# Patient Record
Sex: FEMALE | Race: WHITE | NOT HISPANIC OR LATINO | URBAN - METROPOLITAN AREA
[De-identification: names, ages, dates, MRNs, and addresses within clinical notes are randomized per-mention and may not be internally consistent; named-entity substitution may affect disease eponyms.]

---

## 2021-12-06 ENCOUNTER — NEW PATIENT (OUTPATIENT)
Dept: URBAN - METROPOLITAN AREA CLINIC 53 | Facility: CLINIC | Age: 76
End: 2021-12-06

## 2021-12-06 DIAGNOSIS — H25.13: ICD-10-CM

## 2021-12-06 PROCEDURE — 99204 OFFICE O/P NEW MOD 45 MIN: CPT

## 2021-12-06 ASSESSMENT — TONOMETRY
OD_IOP_MMHG: 15
OS_IOP_MMHG: 14

## 2021-12-06 ASSESSMENT — VISUAL ACUITY
OU_CC: J2
OS_SC: 20/50
OU_CC: 20/40-2
OD_CC: 20/40-2
OS_CC: 20/60
OD_SC: 20/60

## 2021-12-16 ENCOUNTER — DIAGNOSTICS ONLY (OUTPATIENT)
Dept: URBAN - METROPOLITAN AREA CLINIC 53 | Facility: CLINIC | Age: 76
End: 2021-12-16

## 2021-12-16 DIAGNOSIS — H25.13: ICD-10-CM

## 2021-12-16 PROCEDURE — 92136 OPHTHALMIC BIOMETRY: CPT

## 2021-12-16 PROCEDURE — 92025IOL CORNEAL TOPOGRAPHY PREMIUM IOL

## 2021-12-16 ASSESSMENT — KERATOMETRY
OS_AXISANGLE_DEGREES: 132
OD_AXISANGLE2_DEGREES: 103
OD_K1POWER_DIOPTERS: 44.75
OD_K2POWER_DIOPTERS: 43.87
OS_K2POWER_DIOPTERS: 44.37
OS_K1POWER_DIOPTERS: 45.62
OS_AXISANGLE2_DEGREES: 042
OD_AXISANGLE_DEGREES: 13

## 2022-01-17 ENCOUNTER — PRE-OP/H&P (OUTPATIENT)
Dept: URBAN - METROPOLITAN AREA CLINIC 53 | Facility: CLINIC | Age: 77
End: 2022-01-17

## 2022-01-17 VITALS — HEIGHT: 55 IN | DIASTOLIC BLOOD PRESSURE: 74 MMHG | HEART RATE: 67 BPM | SYSTOLIC BLOOD PRESSURE: 125 MMHG

## 2022-01-17 DIAGNOSIS — H25.12: ICD-10-CM

## 2022-01-17 PROCEDURE — PREOP PRE OP VISIT

## 2022-01-17 ASSESSMENT — VISUAL ACUITY
OD_PH: 20/40
OU_CC: 20/50
OS_CC: 20/50
OD_CC: 20/60
OS_PH: 20/40

## 2022-01-17 ASSESSMENT — KERATOMETRY
OD_K1POWER_DIOPTERS: 44.75
OD_AXISANGLE_DEGREES: 13
OS_K2POWER_DIOPTERS: 44.37
OS_K1POWER_DIOPTERS: 45.62
OD_K2POWER_DIOPTERS: 43.87
OS_AXISANGLE2_DEGREES: 042
OD_AXISANGLE2_DEGREES: 103
OS_AXISANGLE_DEGREES: 132

## 2022-01-17 ASSESSMENT — TONOMETRY
OS_IOP_MMHG: 14
OD_IOP_MMHG: 14

## 2022-01-17 NOTE — PATIENT DISCUSSION
CATARACT SURGERY PLANNER - STANDARD IOL/+FEMTO: Phacoemulsification with IOL: Eye: Left|DOS: 1/26/22|Model: DIB00|Power: 23. 5|Target: dist|Femto: yes|Arcs: 34 @ 142 ; 34 @ 322|Visc: duet|Omidria: yes|10% Phenylephrine: no|Epi-shugarcaine: yes|Phaco Setting: dense|BSS+: no|Trypan Blue: no|CTR: no|Olive Tip: no|Atropine: no|Pupilloplasty: no|Notes: no.

## 2022-01-26 ENCOUNTER — POST-OP (OUTPATIENT)
Dept: URBAN - METROPOLITAN AREA CLINIC 48 | Facility: CLINIC | Age: 77
End: 2022-01-26

## 2022-01-26 ENCOUNTER — SURGERY/PROCEDURE (OUTPATIENT)
Dept: URBAN - METROPOLITAN AREA SURGERY 16 | Facility: SURGERY | Age: 77
End: 2022-01-26

## 2022-01-26 DIAGNOSIS — H25.12: ICD-10-CM

## 2022-01-26 DIAGNOSIS — Z98.42: ICD-10-CM

## 2022-01-26 PROBLEM — Z96.1: Noted: 2022-01-26

## 2022-01-26 PROCEDURE — 66984 XCAPSL CTRC RMVL W/O ECP: CPT

## 2022-01-26 PROCEDURE — 99024 POSTOP FOLLOW-UP VISIT: CPT

## 2022-01-26 ASSESSMENT — KERATOMETRY
OS_AXISANGLE2_DEGREES: 042
OS_K2POWER_DIOPTERS: 44.37
OD_AXISANGLE2_DEGREES: 103
OS_AXISANGLE2_DEGREES: 042
OD_AXISANGLE_DEGREES: 13
OS_K2POWER_DIOPTERS: 44.37
OD_K1POWER_DIOPTERS: 44.75
OD_K2POWER_DIOPTERS: 43.87
OD_K2POWER_DIOPTERS: 43.87
OD_K1POWER_DIOPTERS: 44.75
OS_K1POWER_DIOPTERS: 45.62
OD_AXISANGLE2_DEGREES: 103
OS_AXISANGLE_DEGREES: 132
OD_AXISANGLE_DEGREES: 13
OS_AXISANGLE_DEGREES: 132
OS_K1POWER_DIOPTERS: 45.62

## 2022-01-26 ASSESSMENT — TONOMETRY: OS_IOP_MMHG: 22

## 2022-01-26 ASSESSMENT — VISUAL ACUITY: OS_SC: 20/100

## 2022-01-31 ENCOUNTER — POST OP/EVAL OF SECOND EYE (OUTPATIENT)
Dept: URBAN - METROPOLITAN AREA CLINIC 53 | Facility: CLINIC | Age: 77
End: 2022-01-31

## 2022-01-31 DIAGNOSIS — H25.11: ICD-10-CM

## 2022-01-31 PROCEDURE — PREOP PRE OP VISIT

## 2022-01-31 PROCEDURE — 92136 - 2N OPHTHALMIC BIOMETRY BY PARTIAL COHERENCE INTERFEROMETRY WITH INTRAOCULAR LENS POWER CALCULATION

## 2022-01-31 PROCEDURE — 92134 CPTRZ OPH DX IMG PST SGM RTA: CPT

## 2022-01-31 ASSESSMENT — KERATOMETRY
OD_K1POWER_DIOPTERS: 44.75
OD_K2POWER_DIOPTERS: 43.87
OD_AXISANGLE_DEGREES: 13
OS_K2POWER_DIOPTERS: 44.37
OS_AXISANGLE2_DEGREES: 042
OS_K1POWER_DIOPTERS: 45.62
OD_AXISANGLE2_DEGREES: 103
OS_AXISANGLE_DEGREES: 132

## 2022-01-31 ASSESSMENT — VISUAL ACUITY
OS_CC: J1+@16INCHES
OS_SC: 20/30
OU_CC: J1+@16INCHES
OS_SC: J5@16INCHES
OD_CC: 20/50
OD_PH: 20/40

## 2022-01-31 ASSESSMENT — TONOMETRY
OS_IOP_MMHG: 19
OD_IOP_MMHG: 19

## 2022-01-31 NOTE — PATIENT DISCUSSION
CATARACT SURGERY PLANNER - STANDARD IOL/+FEMTO: Phacoemulsification with IOL: Eye: right|DOS: 2/9/22|Model: DIB00|Power: 23|Target: dist|Femto: yes|Arcs: 35 @ 15 ; 35 @ 195|Visc: duet|Omidria: yes|10% Phenylephrine: no|Epi-shugarcaine: yes|Phaco Setting: dense|BSS+: no|Trypan Blue: no|CTR: no|Olive Tip: no|Atropine: no|Pupilloplasty: no|Notes: no.

## 2022-02-09 ENCOUNTER — SURGERY/PROCEDURE (OUTPATIENT)
Dept: URBAN - METROPOLITAN AREA SURGERY 16 | Facility: SURGERY | Age: 77
End: 2022-02-09

## 2022-02-09 ENCOUNTER — POST-OP (OUTPATIENT)
Dept: URBAN - METROPOLITAN AREA CLINIC 48 | Facility: CLINIC | Age: 77
End: 2022-02-09

## 2022-02-09 DIAGNOSIS — H25.11: ICD-10-CM

## 2022-02-09 DIAGNOSIS — Z96.1: ICD-10-CM

## 2022-02-09 DIAGNOSIS — Z98.41: ICD-10-CM

## 2022-02-09 PROCEDURE — 99024 POSTOP FOLLOW-UP VISIT: CPT

## 2022-02-09 PROCEDURE — 66984 XCAPSL CTRC RMVL W/O ECP: CPT

## 2022-02-09 ASSESSMENT — KERATOMETRY
OD_K2POWER_DIOPTERS: 43.87
OS_K2POWER_DIOPTERS: 44.37
OS_AXISANGLE2_DEGREES: 042
OD_K2POWER_DIOPTERS: 43.87
OD_AXISANGLE2_DEGREES: 103
OD_K1POWER_DIOPTERS: 44.75
OS_K2POWER_DIOPTERS: 44.37
OS_AXISANGLE2_DEGREES: 042
OS_AXISANGLE_DEGREES: 132
OD_AXISANGLE_DEGREES: 13
OS_K1POWER_DIOPTERS: 45.62
OS_AXISANGLE_DEGREES: 132
OS_K1POWER_DIOPTERS: 45.62
OD_K1POWER_DIOPTERS: 44.75
OD_AXISANGLE2_DEGREES: 103
OD_AXISANGLE_DEGREES: 13

## 2022-02-09 ASSESSMENT — TONOMETRY: OD_IOP_MMHG: 19

## 2022-02-09 ASSESSMENT — VISUAL ACUITY: OD_SC: 20/70

## 2022-02-14 ENCOUNTER — POST-OP (OUTPATIENT)
Dept: URBAN - METROPOLITAN AREA CLINIC 48 | Facility: CLINIC | Age: 77
End: 2022-02-14

## 2022-02-14 DIAGNOSIS — Z98.41: ICD-10-CM

## 2022-02-14 PROCEDURE — 92134 CPTRZ OPH DX IMG PST SGM RTA: CPT

## 2022-02-14 PROCEDURE — 99024 POSTOP FOLLOW-UP VISIT: CPT

## 2022-02-14 ASSESSMENT — TONOMETRY
OS_IOP_MMHG: 14
OD_IOP_MMHG: 14

## 2022-02-14 ASSESSMENT — KERATOMETRY
OS_AXISANGLE_DEGREES: 132
OS_K1POWER_DIOPTERS: 45.62
OD_AXISANGLE_DEGREES: 13
OD_K1POWER_DIOPTERS: 44.75
OD_AXISANGLE2_DEGREES: 103
OD_K2POWER_DIOPTERS: 43.87
OS_K2POWER_DIOPTERS: 44.37
OS_AXISANGLE2_DEGREES: 042

## 2022-02-14 ASSESSMENT — VISUAL ACUITY
OU_CC: J1+
OU_SC: 20/70
OD_SC: 20/30+1
OS_SC: 20/50+1
OU_SC: J7

## 2022-03-28 ENCOUNTER — POST-OP (OUTPATIENT)
Dept: URBAN - METROPOLITAN AREA CLINIC 48 | Facility: CLINIC | Age: 77
End: 2022-03-28

## 2022-03-28 DIAGNOSIS — Z98.41: ICD-10-CM

## 2022-03-28 DIAGNOSIS — Z98.42: ICD-10-CM

## 2022-03-28 DIAGNOSIS — H26.492: ICD-10-CM

## 2022-03-28 PROCEDURE — 92015 DETERMINE REFRACTIVE STATE: CPT

## 2022-03-28 PROCEDURE — 99024 POSTOP FOLLOW-UP VISIT: CPT

## 2022-03-28 PROCEDURE — 92134 CPTRZ OPH DX IMG PST SGM RTA: CPT

## 2022-03-28 ASSESSMENT — VISUAL ACUITY
OS_CC: J1@18INCHES
OS_SC: 20/30
OD_SC: 20/30+2
OU_CC: J1@18INCHES

## 2022-03-28 ASSESSMENT — KERATOMETRY
OD_K2POWER_DIOPTERS: 43.87
OD_AXISANGLE2_DEGREES: 103
OS_K2POWER_DIOPTERS: 44.37
OD_AXISANGLE_DEGREES: 13
OD_K1POWER_DIOPTERS: 44.75
OS_K1POWER_DIOPTERS: 45.62
OS_AXISANGLE_DEGREES: 132
OS_AXISANGLE2_DEGREES: 042

## 2022-03-28 ASSESSMENT — TONOMETRY
OS_IOP_MMHG: 14
OD_IOP_MMHG: 14

## 2023-05-03 ENCOUNTER — COMPREHENSIVE EXAM (OUTPATIENT)
Dept: URBAN - METROPOLITAN AREA CLINIC 53 | Facility: CLINIC | Age: 78
End: 2023-05-03

## 2023-05-03 DIAGNOSIS — H26.493: ICD-10-CM

## 2023-05-03 DIAGNOSIS — H16.223: ICD-10-CM

## 2023-05-03 PROCEDURE — 92014 COMPRE OPH EXAM EST PT 1/>: CPT

## 2023-05-03 ASSESSMENT — KERATOMETRY
OS_AXISANGLE2_DEGREES: 042
OD_K2POWER_DIOPTERS: 43.87
OS_K2POWER_DIOPTERS: 44.37
OD_AXISANGLE_DEGREES: 13
OD_K1POWER_DIOPTERS: 44.75
OD_AXISANGLE2_DEGREES: 103
OS_K1POWER_DIOPTERS: 45.62
OS_AXISANGLE_DEGREES: 132

## 2023-05-03 ASSESSMENT — VISUAL ACUITY
OU_SC: 20/30+2
OD_CC: 20/25-1
OD_SC: 20/30+1
OD_GLARE: 20/20-1
OD_GLARE: 20/20-1
OS_SC: 20/30
OS_GLARE: 20/30
OS_GLARE: 20/40
OD_PH: 20/25-1
OS_CC: 20/40-2
OU_CC: J1+@16"
OU_CC: 20/20

## 2023-05-03 ASSESSMENT — TONOMETRY
OD_IOP_MMHG: 14
OS_IOP_MMHG: 14

## 2023-05-08 ENCOUNTER — CLINIC PROCEDURE ONLY (OUTPATIENT)
Dept: URBAN - METROPOLITAN AREA CLINIC 53 | Facility: CLINIC | Age: 78
End: 2023-05-08

## 2023-05-08 DIAGNOSIS — H26.493: ICD-10-CM

## 2023-05-08 PROCEDURE — 66821 AFTER CATARACT LASER SURGERY: CPT

## 2023-05-08 ASSESSMENT — KERATOMETRY
OS_AXISANGLE_DEGREES: 132
OD_K2POWER_DIOPTERS: 43.87
OD_AXISANGLE_DEGREES: 13
OS_K1POWER_DIOPTERS: 45.62
OD_AXISANGLE2_DEGREES: 103
OS_K2POWER_DIOPTERS: 44.37
OS_AXISANGLE2_DEGREES: 042
OD_K1POWER_DIOPTERS: 44.75

## 2023-05-08 ASSESSMENT — VISUAL ACUITY
OD_GLARE: 20/20
OS_SC: 20/40
OD_SC: 20/25
OS_GLARE: 20/40
OD_GLARE: 20/20
OS_GLARE: 20/30

## 2023-05-08 ASSESSMENT — TONOMETRY
OD_IOP_MMHG: 12
OS_IOP_MMHG: 13

## 2023-06-05 ENCOUNTER — POST-OP (OUTPATIENT)
Dept: URBAN - METROPOLITAN AREA CLINIC 53 | Facility: CLINIC | Age: 78
End: 2023-06-05

## 2023-06-05 DIAGNOSIS — H52.4: ICD-10-CM

## 2023-06-05 DIAGNOSIS — Z98.890: ICD-10-CM

## 2023-06-05 PROCEDURE — 99024 POSTOP FOLLOW-UP VISIT: CPT

## 2023-06-05 PROCEDURE — 92015 DETERMINE REFRACTIVE STATE: CPT

## 2023-06-05 ASSESSMENT — KERATOMETRY
OD_AXISANGLE_DEGREES: 13
OS_AXISANGLE2_DEGREES: 042
OD_K1POWER_DIOPTERS: 44.75
OD_K2POWER_DIOPTERS: 43.87
OS_K1POWER_DIOPTERS: 45.62
OS_K2POWER_DIOPTERS: 44.37
OS_AXISANGLE_DEGREES: 132
OD_AXISANGLE2_DEGREES: 103

## 2023-06-05 ASSESSMENT — TONOMETRY
OS_IOP_MMHG: 14
OD_IOP_MMHG: 13

## 2023-06-05 ASSESSMENT — VISUAL ACUITY
OD_GLARE: 20/20
OS_PH: 20/30
OD_SC: 20/25
OS_SC: 20/40

## 2024-06-10 ENCOUNTER — COMPREHENSIVE EXAM (OUTPATIENT)
Dept: URBAN - METROPOLITAN AREA CLINIC 53 | Facility: CLINIC | Age: 79
End: 2024-06-10

## 2024-06-10 DIAGNOSIS — H52.4: ICD-10-CM

## 2024-06-10 DIAGNOSIS — H16.223: ICD-10-CM

## 2024-06-10 PROCEDURE — 92014 COMPRE OPH EXAM EST PT 1/>: CPT

## 2024-06-10 PROCEDURE — 92015 DETERMINE REFRACTIVE STATE: CPT

## 2024-06-10 ASSESSMENT — KERATOMETRY
OD_K2POWER_DIOPTERS: 44.50
OD_K1POWER_DIOPTERS: 44.00
OS_AXISANGLE2_DEGREES: 90
OS_K1POWER_DIOPTERS: 45.50
OS_AXISANGLE_DEGREES: 180
OD_AXISANGLE2_DEGREES: 88
OD_AXISANGLE_DEGREES: 178
OS_K2POWER_DIOPTERS: 46.75

## 2024-06-10 ASSESSMENT — TONOMETRY
OS_IOP_MMHG: 13
OD_IOP_MMHG: 13

## 2024-06-10 ASSESSMENT — VISUAL ACUITY
OS_SC: 20/30-2
OD_CC: J1+@14"
OD_GLARE: 20/30
OD_SC: 20/25
OS_CC: J1@14"
OU_SC: 20/25
OD_GLARE: 20/25
OU_CC: J1+@14"
OD_CC: 20/30
OS_CC: 20/30

## 2025-06-16 ENCOUNTER — COMPREHENSIVE EXAM (OUTPATIENT)
Age: 80
End: 2025-06-16

## 2025-06-16 DIAGNOSIS — H26.491: ICD-10-CM

## 2025-06-16 DIAGNOSIS — H16.223: ICD-10-CM

## 2025-06-16 DIAGNOSIS — H52.4: ICD-10-CM

## 2025-06-16 PROCEDURE — 99214 OFFICE O/P EST MOD 30 MIN: CPT | Mod: 57

## 2025-06-16 PROCEDURE — 92015 DETERMINE REFRACTIVE STATE: CPT

## 2025-06-16 RX ORDER — PREDNISOLONE ACETATE 10 MG/ML: 1 SUSPENSION/ DROPS OPHTHALMIC TWICE A DAY

## 2025-06-27 ENCOUNTER — EMERGENCY VISIT (OUTPATIENT)
Age: 80
End: 2025-06-27

## 2025-06-27 DIAGNOSIS — H18.593: ICD-10-CM

## 2025-06-27 PROCEDURE — 99212 OFFICE O/P EST SF 10 MIN: CPT

## 2025-06-27 RX ORDER — CARBOXYMETHYLCELLULOSE SODIUM 10 MG/ML: 1 GEL OPHTHALMIC EVERY EVENING

## 2025-06-30 ENCOUNTER — FOLLOW UP (OUTPATIENT)
Age: 80
End: 2025-06-30

## 2025-06-30 DIAGNOSIS — Z98.890: ICD-10-CM

## 2025-06-30 DIAGNOSIS — H18.593: ICD-10-CM

## 2025-06-30 DIAGNOSIS — H52.4: ICD-10-CM

## 2025-06-30 DIAGNOSIS — H16.223: ICD-10-CM
